# Patient Record
Sex: FEMALE | Race: WHITE | NOT HISPANIC OR LATINO | Employment: FULL TIME | URBAN - METROPOLITAN AREA
[De-identification: names, ages, dates, MRNs, and addresses within clinical notes are randomized per-mention and may not be internally consistent; named-entity substitution may affect disease eponyms.]

---

## 2020-06-03 ENCOUNTER — CONVERSION ENCOUNTER (OUTPATIENT)
Dept: SURGERY | Facility: CLINIC | Age: 25
End: 2020-06-03

## 2020-06-03 ENCOUNTER — OFFICE VISIT CONVERTED (OUTPATIENT)
Dept: SURGERY | Facility: CLINIC | Age: 25
End: 2020-06-03
Attending: NURSE PRACTITIONER

## 2020-06-04 ENCOUNTER — HOSPITAL ENCOUNTER (OUTPATIENT)
Dept: ULTRASOUND IMAGING | Facility: HOSPITAL | Age: 25
Discharge: HOME | End: 2020-06-04
Attending: NURSE PRACTITIONER

## 2020-06-10 ENCOUNTER — OFFICE VISIT CONVERTED (OUTPATIENT)
Dept: SURGERY | Facility: CLINIC | Age: 25
End: 2020-06-10
Attending: SURGERY

## 2020-06-17 LAB — SARS-COV-2 RNA SPEC QL NAA+PROBE: NOT DETECTED

## 2020-06-19 ENCOUNTER — HOSPITAL ENCOUNTER (OUTPATIENT)
Dept: PERIOP | Facility: HOSPITAL | Age: 25
Setting detail: HOSPITAL OUTPATIENT SURGERY
Discharge: HOME | End: 2020-06-19
Attending: SURGERY

## 2020-06-19 LAB — HCG UR QL: NEGATIVE

## 2020-07-02 ENCOUNTER — OFFICE VISIT CONVERTED (OUTPATIENT)
Dept: SURGERY | Facility: CLINIC | Age: 25
End: 2020-07-02
Attending: SURGERY

## 2021-05-10 NOTE — H&P
History and Physical      Patient Name: Annette Stark   Patient ID: [id number]   Sex: Female   YOB: 1995    Primary Care Provider: Keyla BRIDGES   Referring Provider: Keyla BRIDGES    Visit Date: Mandy 3, 2020    Provider: YULIANA Wooten   Location: [facility]   Location Address: [address] [place] [address]  [id number]   Location Phone: [phone]          Chief Complaint  · Nausea  · Vomiting  · Epigastric Pain  · GERD      History Of Present Illness  The patient is a 24 year old /White female presenting to the [facility] office on a referral from Keyla BRIDGES.   Annette Stark needs to have a diagnostic EGD.     Patient currently complains of: N/V and epigastric pain        Patient presents today on referral from Jacqueline Albarran for epigastric pain and nausea and vomiting.  Patient reports that she has been on Carafate and pantoprazole without any relief in her symptoms.  She reports having epigastric pain associated with nausea and vomiting about 30 minutes after eating.  She really notices symptoms after eating fatty or spicy foods.  She denies any diarrhea.       Past Medical History  Disease Name Date Onset Notes   Hyperthyroidism --  --    Hypothyroidism --  --          Past Surgical History  Procedure Name Date Notes   Ear Tubes --  --          Medication List  Name Date Started Instructions   buspirone 15 mg oral tablet  take 1 tablet by oral route 4 times a day   cetirizine 10 mg oral tablet  take 1 tablet (10 mg) by oral route once daily   levothyroxine 88 mcg oral tablet  take 1 tablet (88 mcg) by oral route once daily   oxcarbazepine 300 mg oral tablet  take 1 tablet (300 mg) by oral route 2 times per day   pantoprazole 40 mg oral tablet,delayed release (DR/EC)  take 1 tablet (40 mg) by oral route once daily   Tri-Sprintec (28) 0.18/0.215/0.25 mg-35 mcg (28) oral tablet  take 1 tablet by oral route once daily  "        Allergy List  Allergen Name Date Reaction Notes   Bactrim --  --  --    Cipro --  --  --    PENICILLINS --  --  --          Family Medical History  Disease Name Relative/Age Notes   Family history of colon cancer Grandfather (paternal)/70s   Grandfather (paternal)/70s   -Father's Family History Unknown Father/   Father   Diabetes  --          Social History  Finding Status Start/Stop Quantity Notes   Alcohol Never --/-- --  --    Caffeine Former --/-- --  --    Tobacco Never --/-- --  --          Review of Systems  · Constitutional  o Denies  o : fever, chills  · Eyes  o Denies  o : yellowish discoloration of eyes  · HENT  o Denies  o : difficulty swallowing  · Cardiovascular  o Denies  o : chest pain, chest pain on exertion  · Respiratory  o Denies  o : shortness of breath  · Gastrointestinal  o Admits  o : nausea, vomiting, reflux, abdominal pain  o Denies  o : diarrhea, constipation, heartburn  · Genitourinary  o Denies  o : abnormal color of urine  · Integument  o Denies  o : rash  · Neurologic  o Denies  o : tingling or numbness  · Musculoskeletal  o Denies  o : joint pain  · Endocrine  o Denies  o : weight gain, weight loss      Vitals  Date Time BP Position Site L\R Cuff Size HR RR TEMP (F) WT  HT  BMI kg/m2 BSA m2 O2 Sat        06/03/2020 10:08 AM       16  204lbs 16oz 5'  3\" 36.31 2.03           Physical Examination  · Constitutional  o Appearance  o : well developed, well-nourished, obese, patient in no apparent distress  · Head and Face  o Head  o :   § Inspection  § : atraumatic, normocephalic  o Face  o :   § Inspection  § : no facial lesions  · Eyes  o Conjunctivae  o : conjunctivae normal  o Sclerae  o : sclerae white  · Neck  o Inspection/Palpation  o : normal appearance, no masses or tenderness, trachea midline  · Respiratory  o Respiratory Effort  o : breathing unlabored  · Skin and Subcutaneous Tissue  o General Inspection  o : no lesions present, no areas of discoloration, skin turgor " normal, texture normal  · Neurologic  o Mental Status Examination  o :   § Orientation  § : grossly oriented to person, place and time  § Attention  § : attention normal, concentration abilities normal  § Fund of Knowledge  § : fund of knowledge within normal limits, patient aware of current events  o Gait and Station  o : normal gait, able to stand without difficulty  · Psychiatric  o Judgement and Insight  o : judgment and insight intact  o Mood and Affect  o : mood normal, affect appropriate     Abdomen: soft, round, non-distended, tenderness in the RUQ and epigastric area, no rebound tenderness noted.           Assessment  · Abdominal Pain, Epigastric     789.06/R10.13  · Nausea & vomiting     787.01/R11.2      Plan  · Orders  o Consent for Esophagogastroduodenoscopy (EGD) with dilation - Possible risks/complications, benefits, and alternatives to surgical or invasive procedure have been explained to patient and/or legal guardian. - Patient has been evaluated and can tolerate anesthesia and/or sedation. Risks, benefits, and alternatives to anesthesia and sedation have been explained to patient and/or legal guardian. (02062) - 789.06/R10.13, 787.01/R11.2 - 06/03/2020  o Gallbladder U/S (91253) - 789.06/R10.13, 787.01/R11.2 - 06/04/2020   If negative proceed with disida scan  o DISIDA (HIDA) Scan (76740) - 789.06/R10.13, 787.01/R11.2 - 06/04/2020  · Medications  o Medications have been Reconciled  o Transition of Care or Provider Policy  · Instructions  o Surgical Facility: [facility]  o Handouts Provided Pre-Procedure Instructions including date, time, and location of procedure.   o PLAN: Proceeed with EGD. Patient understands risks/benefits and is willing to proceed.   o ***Surgical Orders***  o RISK AND BENEFITS:  o Given these options, the patient has verbally expressed an understanding of the risks of the surgery and finds these risks acceptable. Will proceed with surgery as soon as  possible.  o O.R. PREP: Per protocol   o IV: Per Anesthesia  o Please sign permit for: Esophagogastroduodenoscopy with possible biopsies and dilation by Dr. Shore.  o The above History and Physical Examination has been completed within 30 days of admission.  o ***Patient Status***  o Outpatient  o Follow up in the in the office post procedure.  o I will get a gallbladder ultrasound if negative will proceed with DISIDA scan. If both imaging are negative we will proceed with EGD. Patient verbalizes understanding of the plan and is agreeable.  o Electronically Identified Patient Education Materials Provided Electronically  · Disposition  o Call or Return if symptoms worsen or persist.            Electronically Signed by: YULIANA Wooten -Author on Mandy 3, 2020 10:18:34 AM

## 2021-05-10 NOTE — H&P
History and Physical      Patient Name: Annette Stark   Patient ID: [id number]   Sex: Female   YOB: 1995    Primary Care Provider: Keyla BRIDGES   Referring Provider: Keyla BRIDGES    Visit Date: Mandy 10, 2020    Provider: Cassius Robison MD   Location: [facility]   Location Address: [address] [place] [address]  [id number]   Location Phone: [phone]          Chief Complaint  · Outpatient History & Physical / Surgical Orders  · Gallbladder Consult      History Of Present Illness  Annette Stark is a 24 year old /White female who presents to the office today as a consult from Keyla BRIDGES. She presents today for evaluation for cholecystectomy. The patient reports that for several weeks, she has had right upper quadrant pain. She reports that the pain has gotten worse in the last week or two. She reports the pain starts in her epigastrium or sometimes the right side and radiates around to her right side. The pain is associated with nausea and vomiting. The pain is intermittent. It is sharp and stabbing and, again, feels like it is going right through to her back. She denies any scleral icterus or jaundice. She reports that eating makes the pain worse. She denies any tea colored urine.       Past Medical History  Hyperthyroidism; Hypothyroidism         Past Surgical History  Ear Tubes         Medication List  buspirone 15 mg oral tablet; cetirizine 10 mg oral tablet; levothyroxine 88 mcg oral tablet; oxcarbazepine 300 mg oral tablet; pantoprazole 40 mg oral tablet,delayed release (DR/EC); Tri-Sprintec (28) 0.18/0.215/0.25 mg-35 mcg (28) oral tablet         Allergy List  Bactrim; Cipro; PENICILLINS         Family Medical History  Family history of colon cancer; -Father's Family History Unknown; Diabetes         Social History  Alcohol (Never); Caffeine (Former); Tobacco (Never)         Review of Systems  · Gastrointestinal  o Denies  o :  "nausea, vomiting, diarrhea, constipation      Vitals  Date Time BP Position Site L\R Cuff Size HR RR TEMP (F) WT  HT  BMI kg/m2 BSA m2 O2 Sat HC       06/10/2020 10:57 AM       14  207lbs 0oz 5'  3\" 36.67 2.04           Physical Examination  · Constitutional  o Appearance  o : well developed, well-nourished, alert and in no acute distress  · Head and Face  o Head  o :   § Inspection  § : no deformities or lesions  · Eyes  o Conjunctivae  o : clear  o Sclerae  o : clear  · Neck  o Inspection/Palpation  o : normal appearance, no masses or tenderness, trachea midline  · Respiratory  o Respiratory Effort  o : breathing unlabored  o Inspection of Chest  o : normal appearance, no retractions  · Cardiovascular  o Heart  o : regular rate and rhythm  · Gastrointestinal  o Abdominal Examination  o : abdomen is soft  · Lymphatic  o Neck  o : no lymphadenopathy present  o Axilla  o : no lymphadenopathy present  o Groin  o : no lymphadenopathy present  · Skin and Subcutaneous Tissue  o General Inspection  o : no rashes present, no lesions present, no areas of discoloration  · Neurologic  o Cranial Nerves  o : grossly intact  o Sensation  o : grossly intact  o Gait and Station  o :   § Gait Screening  § : normal gait, able to stand without diffculty  o Cerebellar Function  o : no obvious abnormalities  · Psychiatric  o Judgement and Insight  o : judgment and insight intact  o Mood and Affect  o : mood normal, affect appropriate          Assessment  · Pre-Surgical Orders     V72.84  · Gall Stones     575.10  · Pre-op testing     V72.84/Z01.818       Patient with gallstones and possible biliary dyskinesia based on her HIDA scan.     Problems Reconciled  Plan  · Orders  o General Surgery Order (GENOR) - 575.10 - 06/19/2020  o [facility] Pre-Op Covid-19 Screening (86091) - V72.84/Z01.818 - 06/16/2020   [facility]- 06/16 @ 9:30a  · Medications  o Medications have been Reconciled  o Transition of Care or Provider " Policy  · Instructions  o PLAN:   o Handouts Provided-Pre-Procedure Instructions including date and time and location of procedure.  o Surgical Facility: [facility]  o ****Patient Status****  o Outpatient  o ********************  o RISK AND BENEFITS:  o Consent for surgery: Given these options, the patient has verbally expressed an understanding of the risks of surgery and finds these risks acceptable. We will proceed with surgery as soon as possible.  o Consult Anesthesia for any post-operative block, or any pain management procedure deemed necessary by the anestesiologist for adequate post-operative pain control.   o O.R. PREP: Per protocol  o IV: Per Anesthesia  o PLEASE SIGN PERMIT FOR:  o *__Kefzol 2 gram IV on call to OR.  o Indocyanine Green- 2.5MG IV- On Call To OR  o *___The above History and Physical Examination has been completed within 30 days of admission.  o Pre-Admission Testing Date: Phone Screen 06/15 @ 2:15p  o Electronically Identified Patient Education Materials Provided Electronically     We will plan for laparoscopic cholecystectomy in the near future. Risks, benefits, and alternatives were discussed with the patient extensively. All questions were answered. The patient voiced understanding and agrees to proceed with the above plan.             Electronically Signed by: Roxana Macias-, -Author on June 11, 2020 09:41:25 AM  Electronically Co-signed by: Cassius Robison MD -Reviewer on June 11, 2020 01:05:10 PM

## 2021-05-13 NOTE — PROGRESS NOTES
Progress Note      Patient Name: Annette Stark   Patient ID: [id number]   Sex: Female   YOB: 1995    Primary Care Provider: Keyla BRIDGES   Referring Provider: Keyla BRIDGES    Visit Date: July 2, 2020    Provider: Cassius Robison MD   Location[facility]   Location Address: [address] [place] [address]  [id number]   Location Phone: [phone]          Chief Complaint  · Status Post Surgery      History Of Present Illness  Annette Stark is a 24 year old /White female who presents today for a postoperative visit.      The patient is a 24-year-old female who follows up status post laparoscopic cholecystectomy.  The patient reports she has done well after her procedure, not reporting any unexpected signs or symptoms.  She is eating and drinking normally and having regular bowel movements.  The symptoms that she was having prior to the operation are much improved, if not gone.  Her only complaint is some soreness at the subxiphoid incision.       Past Medical History  Hyperthyroidism; Hypothyroidism         Past Surgical History  Ear Tubes         Medication List  buspirone 15 mg oral tablet; cetirizine 10 mg oral tablet; levothyroxine 88 mcg oral tablet; oxcarbazepine 300 mg oral tablet; pantoprazole 40 mg oral tablet,delayed release (DR/EC); Tri-Sprintec (28) 0.18/0.215/0.25 mg-35 mcg (28) oral tablet         Allergy List  Bactrim; Cipro; PENICILLINS         Family Medical History  Family history of colon cancer; -Father's Family History Unknown; Diabetes         Social History  Alcohol (Never); Caffeine (Former); Tobacco (Never)         Review of Systems  · Cardiovascular  o Denies  o : chest pain on exertion, shortness of breath, lower extremity swelling  · Respiratory  o Denies  o : shortness of breath, coughing up blood  · Gastrointestinal  o Denies  o : chronic abdominal pain      Vitals  Date Time BP Position Site L\R Cuff Size HR RR TEMP (F)  "WT  HT  BMI kg/m2 BSA m2 O2 Sat        07/02/2020 09:09 AM       12  203lbs 8oz 5'  3\" 36.05 2.03           Physical Examination  · Constitutional  o Appearance  o : well developed, well-nourished, alert and in no acute distress  · Head and Face  o Head  o :   § Inspection  § : no deformities or lesions  · Eyes  o Conjunctivae  o : clear  o Sclerae  o : nonicteric  · Neck  o Inspection/Palpation  o : normal appearance, no masses or tenderness, trachea midline  · Respiratory  o Respiratory Effort  o : breathing unlabored  o Inspection of Chest  o : normal appearance, no retractions  · Cardiovascular  o Heart  o : regular rate and rhythm  · Gastrointestinal  o Abdominal Examination  o :   § Abdomen  § : Soft. Incisions appear to be healing well and no signs of infection.   · Lymphatic  o Neck  o : no lymphadenopathy present  o Axilla  o : no lymphadenopathy present  o Groin  o : no lymphadenopathy present  · Skin and Subcutaneous Tissue  o General Inspection  o : no rashes present, no lesions present, no areas of discoloration  · Neurologic  o Cranial Nerves  o : grossly intact  o Sensation  o : grossly intact  o Gait and Station  o :   § Gait Screening  § : normal gait, able to stand without diffculty  o Cerebellar Function  o : no obvious abnormalities  · Psychiatric  o Judgement and Insight  o : judgment and insight intact  o Mood and Affect  o : mood normal, affect appropriate     PATHOLOGY FINDINGS:  Chronic inflammation, cholesterolosis, and cholelithiasis.               Assessment  · Encounter for examination following surgery     V67.00/Z09       The patient is status post laparoscopic cholecystectomy with the pathology findings of chronic inflammation, cholesterolosis, and cholelithiasis.     Problems Reconciled  Plan  · Medications  o Medications have been Reconciled  o Transition of Care or Provider Policy  · Instructions  o PLAN:  o Follow up as needed.     The patient is doing well and healing as " expected.  I am pleased with her overall progress.  At this point in time she can follow up with me as needed, call with any questions or concerns.  Discussed with the patient and all questions were answered.  She voiced understanding and agreed to proceed with the above plan.             Electronically Signed by: Bianca Mix-, Other -Author on July 6, 2020 10:56:55 AM  Electronically Co-signed by: Cassius Robison MD -Reviewer on July 6, 2020 03:44:23 PM

## 2021-05-15 VITALS — RESPIRATION RATE: 14 BRPM | HEIGHT: 63 IN | BODY MASS INDEX: 36.68 KG/M2 | WEIGHT: 207 LBS

## 2021-05-15 VITALS — WEIGHT: 203.5 LBS | RESPIRATION RATE: 12 BRPM | BODY MASS INDEX: 36.06 KG/M2 | HEIGHT: 63 IN

## 2021-05-15 VITALS — HEIGHT: 63 IN | BODY MASS INDEX: 36.32 KG/M2 | RESPIRATION RATE: 16 BRPM | WEIGHT: 205 LBS

## 2021-09-14 ENCOUNTER — TELEPHONE (OUTPATIENT)
Dept: OBSTETRICS AND GYNECOLOGY | Facility: CLINIC | Age: 26
End: 2021-09-14

## 2021-09-14 RX ORDER — NORGESTIMATE AND ETHINYL ESTRADIOL 7DAYSX3 28
1 KIT ORAL DAILY
Qty: 28 TABLET | Refills: 1 | Status: SHIPPED | OUTPATIENT
Start: 2021-09-14 | End: 2022-09-14

## 2021-09-14 RX ORDER — NORGESTIMATE AND ETHINYL ESTRADIOL 7DAYSX3 28
1 KIT ORAL DAILY
Qty: 28 TABLET | Refills: 1 | Status: SHIPPED | OUTPATIENT
Start: 2021-09-14 | End: 2021-09-14

## 2022-04-20 ENCOUNTER — CONSULT (OUTPATIENT)
Dept: ONCOLOGY | Facility: HOSPITAL | Age: 27
End: 2022-04-20

## 2022-04-20 VITALS
TEMPERATURE: 97.8 F | WEIGHT: 216.05 LBS | OXYGEN SATURATION: 99 % | BODY MASS INDEX: 38.27 KG/M2 | SYSTOLIC BLOOD PRESSURE: 118 MMHG | DIASTOLIC BLOOD PRESSURE: 68 MMHG | HEART RATE: 70 BPM | RESPIRATION RATE: 16 BRPM

## 2022-04-20 DIAGNOSIS — Z83.2 FAMILY HISTORY OF FACTOR V LEIDEN MUTATION: Primary | ICD-10-CM

## 2022-04-20 DIAGNOSIS — D64.9 ANEMIA, UNSPECIFIED TYPE: ICD-10-CM

## 2022-04-20 PROBLEM — IMO0002: Status: ACTIVE | Noted: 2018-06-20

## 2022-04-20 PROBLEM — E03.9 HYPOTHYROIDISM: Status: ACTIVE | Noted: 2017-05-08

## 2022-04-20 PROBLEM — E55.9 VITAMIN D DEFICIENCY: Status: ACTIVE | Noted: 2022-02-02

## 2022-04-20 PROBLEM — F32.A DEPRESSIVE DISORDER: Status: ACTIVE | Noted: 2017-10-17

## 2022-04-20 PROCEDURE — G0463 HOSPITAL OUTPT CLINIC VISIT: HCPCS | Performed by: NURSE PRACTITIONER

## 2022-04-20 PROCEDURE — 99204 OFFICE O/P NEW MOD 45 MIN: CPT | Performed by: NURSE PRACTITIONER

## 2022-04-20 RX ORDER — BUPROPION HYDROCHLORIDE 300 MG/1
TABLET ORAL
COMMUNITY

## 2022-04-20 RX ORDER — OXCARBAZEPINE 300 MG/1
TABLET, FILM COATED ORAL
COMMUNITY

## 2022-04-20 RX ORDER — DOXYCYCLINE HYCLATE 50 MG/1
324 CAPSULE, GELATIN COATED ORAL
Qty: 90 TABLET | Refills: 1 | Status: SHIPPED | OUTPATIENT
Start: 2022-04-20

## 2022-04-20 RX ORDER — BUSPIRONE HYDROCHLORIDE 15 MG/1
TABLET ORAL
COMMUNITY

## 2022-04-20 RX ORDER — LEVOTHYROXINE SODIUM 88 UG/1
88 TABLET ORAL DAILY
COMMUNITY

## 2022-04-20 NOTE — PROGRESS NOTES
Chief Complaint  Anemia    Ro Garcia APRN  Provider, No Known    Records Obtained:  Records of the patients history including those obtained from  [facility] were reviewed and summarized in detail.    Reason for referral: anemia    Subjective          Annette Stark presents to [facility] for anemia.     History of Present Illness   Ms. Annette Stark presents with her mother for new patient evaluation for anemia from her PCP, YULIANA Cooney.     History of thyroid disease and is on thyroid medication. She also reports a history of FVL mutation on her father side of the family in all the female.     Reports she has quite a bit of fatigue. Denies any heavy menstrual cycles. Reports they last about 3 days and then done. Denies any change in bowel habits.     Lab work done on 3/28/22 shows normal thyroid, CBC was normal except for elevated RBC count of 5.25. Reticulocyte count absolute elevated. Iron studies with low iron sat of 14%, low normal iron of 42. Ferritin level is not done. Folate and B-12 are normal. Vitamin D level is normal.         Oncology/Hematology History    No history exists.       Review of Systems   Constitutional: Positive for fatigue. Negative for appetite change, diaphoresis, fever, unexpected weight gain and unexpected weight loss.   HENT: Negative for hearing loss, sore throat and voice change.    Eyes: Negative for blurred vision, double vision, pain, redness and visual disturbance.   Respiratory: Negative for cough, shortness of breath and wheezing.    Cardiovascular: Negative for chest pain, palpitations and leg swelling.   Endocrine: Negative for cold intolerance, heat intolerance, polydipsia and polyuria.   Genitourinary: Negative for decreased urine volume, difficulty urinating, frequency and urinary incontinence.   Musculoskeletal: Negative for arthralgias, back pain, joint swelling and myalgias.   Skin: Negative for color  change, rash, skin lesions and wound.   Neurological: Positive for dizziness, light-headedness and headache. Negative for seizures and numbness.   Hematological: Negative for adenopathy. Does not bruise/bleed easily.   Psychiatric/Behavioral: Negative for depressed mood. The patient is not nervous/anxious.    All other systems reviewed and are negative.      Current Outpatient Medications on File Prior to Visit   Medication Sig Dispense Refill   • buPROPion XL (WELLBUTRIN XL) 300 MG 24 hr tablet Wellbutrin  mg 24 hr tablet, extended release   Take 1 tablet every day by oral route.     • busPIRone (BUSPAR) 15 MG tablet buspirone 15 mg oral tablet take 1 tablet by oral route 4 times a day   Active     • levothyroxine (SYNTHROID, LEVOTHROID) 88 MCG tablet Take 88 mcg by mouth Daily.     • norgestimate-ethinyl estradiol (TriNessa, 28,) 0.18/0.215/0.25 MG-35 MCG per tablet Take 1 tablet by mouth Daily. 28 tablet 1   • OXcarbazepine (TRILEPTAL) 300 MG tablet oxcarbazepine 300 mg oral tablet take 1 tablet (300 mg) by oral route 2 times per day   Active       No current facility-administered medications on file prior to visit.       Allergies   Allergen Reactions   • Ciprofloxacin Unknown - Low Severity   • Penicillins Unknown - Low Severity   • Sulfamethoxazole-Trimethoprim Unknown - High Severity     Past Medical History:   Diagnosis Date   • Anemia    • Thyroid disorder      Past Surgical History:   Procedure Laterality Date   • CHOLECYSTECTOMY       Social History     Socioeconomic History   • Marital status: Single   Tobacco Use   • Smoking status: Never Smoker   • Smokeless tobacco: Never Used   Substance and Sexual Activity   • Alcohol use: Not Currently   • Drug use: Not Currently   • Sexual activity: Defer     Family History   Problem Relation Age of Onset   • Factor V Leiden deficiency Paternal Aunt    • Factor V Leiden deficiency Paternal Grandmother    • Breast cancer Paternal Grandmother    • Colon cancer  Paternal Grandfather        There is no immunization history on file for this patient.    Objective   Physical Exam  Constitutional:       Appearance: Normal appearance. She is obese.   HENT:      Head: Normocephalic.      Nose: Nose normal.      Mouth/Throat:      Mouth: Mucous membranes are moist.   Eyes:      Pupils: Pupils are equal, round, and reactive to light.   Cardiovascular:      Rate and Rhythm: Normal rate and regular rhythm.      Pulses: Normal pulses.      Heart sounds: Normal heart sounds. No murmur heard.  Pulmonary:      Effort: Pulmonary effort is normal. No respiratory distress.      Breath sounds: No wheezing, rhonchi or rales.   Abdominal:      Palpations: Abdomen is soft.   Musculoskeletal:         General: Normal range of motion.      Cervical back: Normal range of motion and neck supple.   Skin:     General: Skin is warm and dry.      Capillary Refill: Capillary refill takes less than 2 seconds.   Neurological:      General: No focal deficit present.      Mental Status: She is alert and oriented to person, place, and time.   Psychiatric:         Mood and Affect: Mood normal.         Behavior: Behavior normal.         Thought Content: Thought content normal.         Judgment: Judgment normal.         Vitals:    04/20/22 1440   BP: 118/68   Pulse: 70   Resp: 16   Temp: 97.8 °F (36.6 °C)   SpO2: 99%   Weight: 98 kg (216 lb 0.8 oz)   PainSc:   5   PainLoc: Head     ECOG score: 0         ECOG: (0) Fully Active - Able to Carry On All Pre-disease Performance Without Restriction  Fall Risk Assessment was completed, and patient is at low risk for falls.  PHQ-9 Total Score: 0       The patient is  experiencing fatigue. Fatigue score: 8    PT/OT Functional Screening: PT fx screen: No needs identified  Speech Functional Screening: Speech fx screen: No needs identified  Rehab to be ordered: Rehab to be ordered: No needs identified        Result Review :   The following data was reviewed by: Funmilayo RUFFIN  YULIANA Thompson on 04/20/2022:  No results found for: HGB, HCT, MCV, PLT, WBC, NEUTROABS, LYMPHSABS, MONOSABS, EOSABS, BASOSABS  No results found for: GLUCOSE, BUN, CREATININE, NA, K, CL, CO2, CALCIUM, PROTEINTOT, ALBUMIN, BILITOT, ALKPHOS, AST, ALT       Assessment and Plan    Diagnoses and all orders for this visit:    1. Family history of factor V Leiden mutation (Primary)  -     Factor 5 Leiden; Future    2. Anemia, unspecified type  -     CBC & Differential; Future  -     Comprehensive Metabolic Panel; Future  -     Folate; Future  -     Vitamin B12; Future  -     Iron Profile; Future  -     Ferritin; Future  -     ferrous gluconate (FERGON) 324 MG tablet; Take 1 tablet by mouth Daily With Breakfast.  Dispense: 90 tablet; Refill: 1    Reports family history of factor V leiden, although this should not be causing her fatigue. We will put her on oral ferrous gluconate, 1 tab PO daily. This will be sent to her pharmacy. Recheck lab work in 6-8 weeks to see if she has any improvement in her fatigue and iron studies. We will check her for any factor V Leiden mutation just so she will know if she has it or not.     I instructed her to call if she can not tolerate the oral iron.         Patient Follow Up: 8 weeks with NP.     Patient was given instructions and counseling regarding her condition or for health maintenance advice. Please see specific information pulled into the AVS if appropriate.     Funmilayo Thompson, YULIANA    4/20/2022

## 2022-05-27 ENCOUNTER — TELEPHONE (OUTPATIENT)
Dept: ONCOLOGY | Facility: HOSPITAL | Age: 27
End: 2022-05-27

## 2022-05-27 NOTE — TELEPHONE ENCOUNTER
called to inform that provider will be out of the office on 7/1, changed appt to 6/30.  asked her to call us if that change doesn't work.

## 2022-06-29 ENCOUNTER — LAB (OUTPATIENT)
Dept: ONCOLOGY | Facility: HOSPITAL | Age: 27
End: 2022-06-29

## 2022-06-29 DIAGNOSIS — Z83.2 FAMILY HISTORY OF FACTOR V LEIDEN MUTATION: ICD-10-CM

## 2022-06-29 DIAGNOSIS — D64.9 ANEMIA, UNSPECIFIED TYPE: ICD-10-CM

## 2022-06-29 LAB
ALBUMIN SERPL-MCNC: 4.51 G/DL (ref 3.5–5.2)
ALBUMIN/GLOB SERPL: 2.3 G/DL
ALP SERPL-CCNC: 69 U/L (ref 39–117)
ALT SERPL W P-5'-P-CCNC: 10 U/L (ref 1–33)
ANION GAP SERPL CALCULATED.3IONS-SCNC: 9.5 MMOL/L (ref 5–15)
AST SERPL-CCNC: 13 U/L (ref 1–32)
BASOPHILS # BLD AUTO: 0.05 10*3/MM3 (ref 0–0.2)
BASOPHILS NFR BLD AUTO: 0.7 % (ref 0–1.5)
BILIRUB SERPL-MCNC: 0.5 MG/DL (ref 0–1.2)
BUN SERPL-MCNC: 12 MG/DL (ref 6–20)
BUN/CREAT SERPL: 15.4 (ref 7–25)
CALCIUM SPEC-SCNC: 9.3 MG/DL (ref 8.6–10.5)
CHLORIDE SERPL-SCNC: 104 MMOL/L (ref 98–107)
CO2 SERPL-SCNC: 23.5 MMOL/L (ref 22–29)
CREAT SERPL-MCNC: 0.78 MG/DL (ref 0.57–1)
DEPRECATED RDW RBC AUTO: 36.8 FL (ref 37–54)
EGFRCR SERPLBLD CKD-EPI 2021: 107.6 ML/MIN/1.73
EOSINOPHIL # BLD AUTO: 0.28 10*3/MM3 (ref 0–0.4)
EOSINOPHIL NFR BLD AUTO: 4 % (ref 0.3–6.2)
ERYTHROCYTE [DISTWIDTH] IN BLOOD BY AUTOMATED COUNT: 12.5 % (ref 12.3–15.4)
FERRITIN SERPL-MCNC: 87.15 NG/ML (ref 13–150)
FOLATE SERPL-MCNC: 17 NG/ML (ref 4.78–24.2)
GLOBULIN UR ELPH-MCNC: 2 GM/DL
GLUCOSE SERPL-MCNC: 88 MG/DL (ref 65–99)
HCT VFR BLD AUTO: 41.1 % (ref 34–46.6)
HGB BLD-MCNC: 13.6 G/DL (ref 12–15.9)
IMM GRANULOCYTES # BLD AUTO: 0.02 10*3/MM3 (ref 0–0.05)
IMM GRANULOCYTES NFR BLD AUTO: 0.3 % (ref 0–0.5)
IRON 24H UR-MRATE: 121 MCG/DL (ref 37–145)
IRON SATN MFR SERPL: 36 % (ref 20–50)
LYMPHOCYTES # BLD AUTO: 2.98 10*3/MM3 (ref 0.7–3.1)
LYMPHOCYTES NFR BLD AUTO: 42.6 % (ref 19.6–45.3)
MCH RBC QN AUTO: 26.6 PG (ref 26.6–33)
MCHC RBC AUTO-ENTMCNC: 33.1 G/DL (ref 31.5–35.7)
MCV RBC AUTO: 80.4 FL (ref 79–97)
MONOCYTES # BLD AUTO: 0.55 10*3/MM3 (ref 0.1–0.9)
MONOCYTES NFR BLD AUTO: 7.9 % (ref 5–12)
NEUTROPHILS NFR BLD AUTO: 3.11 10*3/MM3 (ref 1.7–7)
NEUTROPHILS NFR BLD AUTO: 44.5 % (ref 42.7–76)
PLATELET # BLD AUTO: 284 10*3/MM3 (ref 140–450)
PMV BLD AUTO: 10 FL (ref 6–12)
POTASSIUM SERPL-SCNC: 3.8 MMOL/L (ref 3.5–5.2)
PROT SERPL-MCNC: 6.5 G/DL (ref 6–8.5)
RBC # BLD AUTO: 5.11 10*6/MM3 (ref 3.77–5.28)
SODIUM SERPL-SCNC: 137 MMOL/L (ref 136–145)
TIBC SERPL-MCNC: 338 MCG/DL (ref 298–536)
TRANSFERRIN SERPL-MCNC: 227 MG/DL (ref 200–360)
VIT B12 BLD-MCNC: 746 PG/ML (ref 211–946)
WBC NRBC COR # BLD: 6.99 10*3/MM3 (ref 3.4–10.8)

## 2022-06-29 PROCEDURE — 85025 COMPLETE CBC W/AUTO DIFF WBC: CPT

## 2022-06-29 PROCEDURE — 82728 ASSAY OF FERRITIN: CPT

## 2022-06-29 PROCEDURE — 83540 ASSAY OF IRON: CPT

## 2022-06-29 PROCEDURE — 84466 ASSAY OF TRANSFERRIN: CPT

## 2022-06-29 PROCEDURE — 82607 VITAMIN B-12: CPT

## 2022-06-29 PROCEDURE — 82746 ASSAY OF FOLIC ACID SERUM: CPT

## 2022-06-29 PROCEDURE — 80053 COMPREHEN METABOLIC PANEL: CPT

## 2022-06-29 PROCEDURE — 81241 F5 GENE: CPT

## 2022-06-29 PROCEDURE — 36415 COLL VENOUS BLD VENIPUNCTURE: CPT

## 2022-06-30 ENCOUNTER — APPOINTMENT (OUTPATIENT)
Dept: ONCOLOGY | Facility: HOSPITAL | Age: 27
End: 2022-06-30

## 2022-07-01 LAB — F5 GENE MUT ANL BLD/T: NORMAL

## 2022-07-05 ENCOUNTER — TELEPHONE (OUTPATIENT)
Dept: ONCOLOGY | Facility: HOSPITAL | Age: 27
End: 2022-07-05

## 2022-07-06 ENCOUNTER — APPOINTMENT (OUTPATIENT)
Dept: ONCOLOGY | Facility: HOSPITAL | Age: 27
End: 2022-07-06

## 2022-07-08 ENCOUNTER — OFFICE VISIT (OUTPATIENT)
Dept: ONCOLOGY | Facility: HOSPITAL | Age: 27
End: 2022-07-08

## 2022-07-08 VITALS
WEIGHT: 220.24 LBS | DIASTOLIC BLOOD PRESSURE: 72 MMHG | OXYGEN SATURATION: 98 % | TEMPERATURE: 97.5 F | RESPIRATION RATE: 18 BRPM | SYSTOLIC BLOOD PRESSURE: 120 MMHG | BODY MASS INDEX: 39.01 KG/M2 | HEART RATE: 74 BPM

## 2022-07-08 DIAGNOSIS — E61.1 IRON DEFICIENCY: Primary | ICD-10-CM

## 2022-07-08 PROCEDURE — 99213 OFFICE O/P EST LOW 20 MIN: CPT | Performed by: INTERNAL MEDICINE

## 2022-07-08 PROCEDURE — G0463 HOSPITAL OUTPT CLINIC VISIT: HCPCS

## 2022-07-08 NOTE — PROGRESS NOTES
Annette BLAIR [facility]   : 1995     LOCATION: [facility] HEMATOLOGY & ONCOLOGY     Chief Complaint  Anemia    Referring Provider: MICAELA Henley  PCP: Provider, No Known    Oncology/Hematology History    No history exists.           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    CBC was normal except for elevated RBC count of 5.25. Reticulocyte count absolute elevated. Iron studies with low iron sat of 14%, low normal iron of 42. Ferritin level is not done. Folate and B-12 are normal. Vitamin D level is normal.   She was started on oral iron therapy  Review of Systems   Constitutional: Positive for fatigue.   All other systems reviewed and are negative.    Current Outpatient Medications on File Prior to Visit   Medication Sig Dispense Refill   • buPROPion XL (WELLBUTRIN XL) 300 MG 24 hr tablet Wellbutrin  mg 24 hr tablet, extended release   Take 1 tablet every day by oral route.     • busPIRone (BUSPAR) 15 MG tablet buspirone 15 mg oral tablet take 1 tablet by oral route 4 times a day   Active     • ferrous gluconate (FERGON) 324 MG tablet Take 1 tablet by mouth Daily With Breakfast. 90 tablet 1   • levothyroxine (SYNTHROID, LEVOTHROID) 88 MCG tablet Take 88 mcg by mouth Daily.     • norgestimate-ethinyl estradiol (TriNessa, 28,) 0.18/0.215/0.25 MG-35 MCG per tablet Take 1 tablet by mouth Daily. 28 tablet 1   • OXcarbazepine (TRILEPTAL) 300 MG tablet oxcarbazepine 300 mg oral tablet take 1 tablet (300 mg) by oral route 2 times per day   Active       No current facility-administered medications on file prior to visit.       Allergies   Allergen Reactions   • Ciprofloxacin Unknown - Low Severity   • Penicillins Unknown - Low Severity   • Sulfamethoxazole-Trimethoprim Unknown - High Severity       Past Medical History:   Diagnosis Date   • Anemia    • Thyroid disorder       Past Surgical History:   Procedure Laterality Date   • CHOLECYSTECTOMY       Social History     Socioeconomic History   • Marital status: Single   Tobacco Use   • Smoking status: Never Smoker   • Smokeless tobacco: Never Used   Substance and Sexual Activity   • Alcohol use: Not Currently   • Drug use: Not Currently   • Sexual activity: Defer     Family History   Problem Relation Age of Onset   • Factor V Leiden deficiency Paternal Aunt    • Factor V Leiden deficiency Paternal Grandmother    • Breast cancer Paternal Grandmother    • Colon cancer Paternal Grandfather        There is no immunization history on file for this patient.    Objective     /72   Pulse 74   Temp 97.5 °F (36.4 °C)   Resp 18   Wt 99.9 kg (220 lb 3.8 oz)   SpO2 98%   BMI 39.01 kg/m²         Physical Exam    Deferred for discussion      Iron   Date Value Ref Range Status   06/29/2022 121 37 - 145 mcg/dL Final     Iron Saturation   Date Value Ref Range Status   06/29/2022 36 20 - 50 % Final     Transferrin   Date Value Ref Range Status   06/29/2022 227 200 - 360 mg/dL Final     TIBC   Date Value Ref Range Status   06/29/2022 338 298 - 536 mcg/dL Final     Ferritin   Date Value Ref Range Status   06/29/2022 87.15 13.00 - 150.00 ng/mL Final     Vitamin B-12   Date Value Ref Range Status   06/29/2022 746 211 - 946 pg/mL Final     Folate   Date Value Ref Range Status   06/29/2022 17.00 4.78 - 24.20 ng/mL Final     ECOG score: 0           PHQ-9 Total Score:           Result Review :   The following data was reviewed by: Suad Yao MD on 07/08/2022:  Lab Results   Component Value Date    HGB 13.6 06/29/2022    HCT 41.1 06/29/2022    MCV 80.4 06/29/2022     06/29/2022    WBC 6.99 06/29/2022    NEUTROABS 3.11 06/29/2022    LYMPHSABS 2.98 06/29/2022    MONOSABS 0.55 06/29/2022    EOSABS 0.28 06/29/2022    BASOSABS 0.05 06/29/2022     Lab Results   Component Value Date    GLUCOSE 88 06/29/2022    BUN 12 06/29/2022    CREATININE  0.78 06/29/2022     06/29/2022    K 3.8 06/29/2022     06/29/2022    CO2 23.5 06/29/2022    CALCIUM 9.3 06/29/2022    PROTEINTOT 6.5 06/29/2022    ALBUMIN 4.51 06/29/2022    BILITOT 0.5 06/29/2022    ALKPHOS 69 06/29/2022    AST 13 06/29/2022    ALT 10 06/29/2022         Data reviewed: labs          Assessment and Plan      ASSESSMENT   anemia   fx hx of FVL   PLAN/RECOMMENDATIONS  Iron studies  are now wnl   hemoglobin is wnl   Factor V leiden mutation was not  detected  F/u prn  Diagnoses and all orders for this visit:    1. Iron deficiency (Primary)      I spent 20 minutes caring for Annette on this date of service. This time includes time spent by me in the following activities: reviewing tests, performing a medically appropriate examination and/or evaluation, counseling and educating the patient/family/caregiver, documenting information in the medical record and independently interpreting results and communicating that information with the patient/family/caregiver    Patient was given instructions and counseling regarding her condition or for health maintenance advice. Please see specific information pulled into the AVS if appropriate.     Suad Yao MD    7/8/2022

## 2022-09-22 PROCEDURE — 87086 URINE CULTURE/COLONY COUNT: CPT | Performed by: FAMILY MEDICINE

## 2022-09-23 ENCOUNTER — TRANSCRIBE ORDERS (OUTPATIENT)
Dept: ADMINISTRATIVE | Facility: HOSPITAL | Age: 27
End: 2022-09-23

## 2022-09-23 ENCOUNTER — HOSPITAL ENCOUNTER (OUTPATIENT)
Dept: GENERAL RADIOLOGY | Facility: HOSPITAL | Age: 27
Discharge: HOME | End: 2022-09-23
Admitting: NURSE PRACTITIONER

## 2022-09-23 ENCOUNTER — TELEPHONE (OUTPATIENT)
Dept: URGENT CARE | Facility: CLINIC | Age: 27
End: 2022-09-23

## 2022-09-23 DIAGNOSIS — R10.9 ABDOMINAL PAIN, UNSPECIFIED ABDOMINAL LOCATION: Primary | ICD-10-CM

## 2022-09-23 DIAGNOSIS — R10.9 ABDOMINAL PAIN, UNSPECIFIED ABDOMINAL LOCATION: ICD-10-CM

## 2022-09-23 PROCEDURE — 74018 RADEX ABDOMEN 1 VIEW: CPT

## 2023-05-23 ENCOUNTER — OFFICE VISIT (OUTPATIENT)
Dept: GASTROENTEROLOGY | Facility: CLINIC | Age: 28
End: 2023-05-23
Payer: COMMERCIAL

## 2023-05-23 VITALS
HEART RATE: 59 BPM | SYSTOLIC BLOOD PRESSURE: 133 MMHG | WEIGHT: 223 LBS | BODY MASS INDEX: 39.51 KG/M2 | DIASTOLIC BLOOD PRESSURE: 76 MMHG | HEIGHT: 63 IN

## 2023-05-23 DIAGNOSIS — R12 HEARTBURN: ICD-10-CM

## 2023-05-23 DIAGNOSIS — R13.19 ESOPHAGEAL DYSPHAGIA: Primary | ICD-10-CM

## 2023-05-23 DIAGNOSIS — R10.84 GENERALIZED ABDOMINAL PAIN: ICD-10-CM

## 2023-05-23 RX ORDER — PANTOPRAZOLE SODIUM 20 MG/1
20 TABLET, DELAYED RELEASE ORAL DAILY
Qty: 90 TABLET | Refills: 1 | Status: SHIPPED | OUTPATIENT
Start: 2023-05-23 | End: 2023-08-21

## 2023-05-23 NOTE — PROGRESS NOTES
Chief Complaint     Difficulty Swallowing and Abdominal Pain    History of Present Illness     Annette Stark is a 27 y.o. female who presents to [facility] GASTROENTEROLOGY on referral from YULIANA Cooney for a gastroenterology evaluation of dysphagia and abdominal pain.      She reports dysphagia that's intermittent.  This occurs every 2-3 days.  States that she feels foods get lodged and she has to induce vomiting to dislodge.  Denies this occurring with any particular foods, but it is worse with solids.     Admits frequent heartburn.  This occurs daily.  Will take OTC pepcid with little relief.      States that she will sometimes experience pain when lifting things in upper stomach and near umbilicus.  She describes this as a burning sensation in the abdomen.     History      Past Medical History:   Diagnosis Date   • Anemia    • Thyroid disorder        Past Surgical History:   Procedure Laterality Date   • CHOLECYSTECTOMY         Family History   Problem Relation Age of Onset   • Factor V Leiden deficiency Paternal Aunt    • Factor V Leiden deficiency Paternal Grandmother    • Breast cancer Paternal Grandmother    • Colon cancer Paternal Grandfather         Current Medications        Current Outpatient Medications:   •  busPIRone (BUSPAR) 15 MG tablet, buspirone 15 mg oral tablet take 1 tablet by oral route 4 times a day   Active, Disp: , Rfl:   •  levothyroxine (SYNTHROID, LEVOTHROID) 88 MCG tablet, Take 1 tablet by mouth Daily., Disp: , Rfl:   •  pantoprazole (Protonix) 20 MG EC tablet, Take 1 tablet by mouth Daily for 90 days., Disp: 90 tablet, Rfl: 1     Allergies     Allergies   Allergen Reactions   • Ciprofloxacin Unknown - Low Severity   • Penicillins Unknown - Low Severity   • Sulfamethoxazole-Trimethoprim Unknown - High Severity       Social History       Social History     Social History Narrative   • Not on file       Immunizations     Immunization:    There is no  "immunization history on file for this patient.       Objective     Objective     Vital Signs:   /76 (BP Location: Left arm, Patient Position: Sitting, Cuff Size: Adult)   Pulse 59   Ht 160 cm (63\")   Wt 101 kg (223 lb)   BMI 39.50 kg/m²       Physical Exam    Results      Result Review :   The following data was reviewed by: YULIANA Tafoya on 05/23/2023:    CBC w/diff        6/29/2022    08:55   CBC w/Diff   WBC 6.99     RBC 5.11     Hemoglobin 13.6     Hematocrit 41.1     MCV 80.4     MCH 26.6     MCHC 33.1     RDW 12.5     Platelets 284     Neutrophil Rel % 44.5     Immature Granulocyte Rel % 0.3     Lymphocyte Rel % 42.6     Monocyte Rel % 7.9     Eosinophil Rel % 4.0     Basophil Rel % 0.7       CMP        6/29/2022    08:55   CMP   Glucose 88     BUN 12     Creatinine 0.78     EGFR 107.6     Sodium 137     Potassium 3.8     Chloride 104     Calcium 9.3     Total Protein 6.5     Albumin 4.51     Globulin 2.0     Total Bilirubin 0.5     Alkaline Phosphatase 69     AST (SGOT) 13     ALT (SGPT) 10     Albumin/Globulin Ratio 2.3     BUN/Creatinine Ratio 15.4     Anion Gap 9.5       9/23/2022 Abdominal x-ray-normal.         Assessment and Plan        Assessment and Plan    Diagnoses and all orders for this visit:    1. Esophageal dysphagia (Primary)  -     Case Request; Standing  -     Case Request    2. Heartburn  -     Case Request; Standing  -     Case Request  -     pantoprazole (Protonix) 20 MG EC tablet; Take 1 tablet by mouth Daily for 90 days.  Dispense: 90 tablet; Refill: 1    3. Generalized abdominal pain  -     US Abdomen Complete; Future    Other orders  -     Follow Anesthesia Guidelines / Protocol; Future  -     Obtain Informed Consent; Future  -     Obtain Informed Consent; Standing  -     Verify NPO; Standing        ESOPHAGOGASTRODUODENOSCOPY (N/A) The risk of the endoscopy were discussed in detail. Possible risks/complications, benefits, and alternatives to surgical or invasive " procedure have been explained to patient and/or legal guardian; risks include bleeding, infection, and perforation. Patient has been evaluated and can tolerate anesthesia and/or sedation.       Follow Up        Follow Up   Return for F/U after procedure.  Patient was given instructions and counseling regarding her condition or for health maintenance advice. Please see specific information pulled into the AVS if appropriate.

## 2023-05-23 NOTE — PATIENT INSTRUCTIONS
Food Choices for Gastroesophageal Reflux Disease, Adult  When you have gastroesophageal reflux disease (GERD), the foods you eat and your eating habits are very important. Choosing the right foods can help ease your discomfort. Think about working with a food expert (dietitian) to help you make good choices.  What are tips for following this plan?  Reading food labels  Look for foods that are low in saturated fat. Foods that may help with your symptoms include:  Foods that have less than 5% of daily value (DV) of fat.  Foods that have 0 grams of trans fat.  Cooking  Do not ocampo your food.  Cook your food by baking, steaming, grilling, or broiling. These are all methods that do not need a lot of fat for cooking.  To add flavor, try to use herbs that are low in spice and acidity.  Meal planning    Choose healthy foods that are low in fat, such as:  Fruits and vegetables.  Whole grains.  Low-fat dairy products.  Lean meats, fish, and poultry.  Eat small meals often instead of eating 3 large meals each day. Eat your meals slowly in a place where you are relaxed. Avoid bending over or lying down until 2-3 hours after eating.  Limit high-fat foods such as fatty meats or fried foods.  Limit your intake of fatty foods, such as oils, butter, and shortening.  Avoid the following as told by your doctor:  Foods that cause symptoms. These may be different for different people. Keep a food diary to keep track of foods that cause symptoms.  Alcohol.  Drinking a lot of liquid with meals.  Eating meals during the 2-3 hours before bed.  Lifestyle  Stay at a healthy weight. Ask your doctor what weight is healthy for you. If you need to lose weight, work with your doctor to do so safely.  Exercise for at least 30 minutes on 5 or more days each week, or as told by your doctor.  Wear loose-fitting clothes.  Do not smoke or use any products that contain nicotine or tobacco. If you need help quitting, ask your doctor.  Sleep with the head  of your bed higher than your feet. Use a wedge under the mattress or blocks under the bed frame to raise the head of the bed.  Chew sugar-free gum after meals.  What foods should eat?  Eat a healthy, well-balanced diet of fruits, vegetables, whole grains, low-fat dairy products, lean meats, fish, and poultry. Each person is different.  Foods that may cause symptoms in one person may not cause any symptoms in another person. Work with your doctor to find foods that are safe for you.  The items listed above may not be a complete list of what you can eat and drink. Contact a food expert for more options.  What foods should I avoid?  Limiting some of these foods may help in managing the symptoms of GERD. Everyone is different. Talk with a food expert or your doctor to help you find the exact foods to avoid, if any.  Fruits  Any fruits prepared with added fat. Any fruits that cause symptoms. For some people, this may include citrus fruits, such as oranges, grapefruit, pineapple, and griselda.  Vegetables  Deep-fried vegetables. French fries. Any vegetables prepared with added fat. Any vegetables that cause symptoms. For some people, this may include tomatoes and tomato products, chili peppers, onions and garlic, and horseradish.  Grains  Pastries or quick breads with added fat.  Meats and other proteins  High-fat meats, such as fatty beef or pork, hot dogs, ribs, ham, sausage, salami, and jalloh. Fried meat or protein, including fried fish and fried chicken. Nuts and nut butters, in large amounts.  Dairy  Whole milk and chocolate milk. Sour cream. Cream. Ice cream. Cream cheese. Milkshakes.  Fats and oils  Butter. Margarine. Shortening. Ghee.  Beverages  Coffee and tea, with or without caffeine. Carbonated beverages. Sodas. Energy drinks. Fruit juice made with acidic fruits, such as orange or grapefruit. Tomato juice. Alcoholic drinks.  Sweets and desserts  Chocolate and cocoa. Donuts.  Seasonings and condiments  Pepper.  Peppermint and spearmint. Added salt. Any condiments, herbs, or seasonings that cause symptoms. For some people, this may include yu, hot sauce, or vinegar-based salad dressings.  The items listed above may not be a complete list of what you should not eat and drink. Contact a food expert for more options.  Questions to ask your doctor  Diet and lifestyle changes are often the first steps that are taken to manage symptoms of GERD. If diet and lifestyle changes do not help, talk with your doctor about taking medicines.  Where to find more information  International Foundation for Gastrointestinal Disorders: aboutgerd.org  Summary  When you have GERD, food and lifestyle choices are very important in easing your symptoms.  Eat small meals often instead of 3 large meals a day. Eat your meals slowly and in a place where you are relaxed.  Avoid bending over or lying down until 2-3 hours after eating.  Limit high-fat foods such as fatty meats or fried foods.  This information is not intended to replace advice given to you by your health care provider. Make sure you discuss any questions you have with your health care provider.  Document Revised: 06/28/2021 Document Reviewed: 06/28/2021  Elsevier Patient Education © 2022 Else[employer] Inc.

## 2023-07-24 DIAGNOSIS — K76.9 LIVER LESION: Primary | ICD-10-CM

## 2023-07-27 ENCOUNTER — TELEPHONE (OUTPATIENT)
Dept: GASTROENTEROLOGY | Facility: CLINIC | Age: 28
End: 2023-07-27
Payer: COMMERCIAL

## 2023-07-27 NOTE — TELEPHONE ENCOUNTER
----- Message from YULIANA Tafoya sent at 7/24/2023 10:01 AM EDT -----  Please let pt know that CT shows a small fat containing umbilical hernia.  Otherwise, no hernia evident.  There are 2 lesions in the liver that could be hemangiomas, but need MRI to confirm if these are concerning.  Please schedule.

## 2023-08-04 ENCOUNTER — TELEPHONE (OUTPATIENT)
Dept: GASTROENTEROLOGY | Facility: CLINIC | Age: 28
End: 2023-08-04
Payer: COMMERCIAL

## 2023-08-07 ENCOUNTER — TELEPHONE (OUTPATIENT)
Dept: GASTROENTEROLOGY | Facility: CLINIC | Age: 28
End: 2023-08-07
Payer: COMMERCIAL

## 2023-08-07 NOTE — TELEPHONE ENCOUNTER
Patient left a voice message on 8/4/23 wanting to know her arrival time of her procedure. I left her a voice message letting her know the arrival time is 9:30.

## 2023-08-07 NOTE — TELEPHONE ENCOUNTER
Caller: DURAN HATCH    Relationship to patient: SELF    Best call back number: [phone]     Patient is needing: MS. HATCH HAS A PROCEDURE ON 8/18/23 AND HAS SOME QUESTIONS IN REGARDS TO WHAT SHE DOES AND DOESN'T NEED TO DO PRIOR. PLEASE REVIEW AND ADVISE.    OK TO CALL ANYTIME. OK TO LEAVE VM

## 2023-08-09 NOTE — PRE-PROCEDURE INSTRUCTIONS
"Instructed on date and arrival time of 0930. Come to entrance \"C\".  Must have  over age 18 to drive home.  May have two visitors; however, children under 12 must stay in waiting room.  Discussed diet/NPO.  May take medications as usual except for blood thinners, diabetic medications, or weight loss medications.  Bring list of medications to hospital.  Verbalized understanding of instructions given.  Phone number given for questions or concerns.  "

## 2023-08-18 ENCOUNTER — ANESTHESIA EVENT (OUTPATIENT)
Dept: GASTROENTEROLOGY | Facility: HOSPITAL | Age: 28
End: 2023-08-18
Payer: COMMERCIAL

## 2023-08-18 ENCOUNTER — ANESTHESIA (OUTPATIENT)
Dept: GASTROENTEROLOGY | Facility: HOSPITAL | Age: 28
End: 2023-08-18
Payer: COMMERCIAL

## 2023-08-18 ENCOUNTER — HOSPITAL ENCOUNTER (OUTPATIENT)
Facility: HOSPITAL | Age: 28
Setting detail: HOSPITAL OUTPATIENT SURGERY
Discharge: HOME | End: 2023-08-18
Attending: INTERNAL MEDICINE | Admitting: INTERNAL MEDICINE
Payer: COMMERCIAL

## 2023-08-18 VITALS
HEIGHT: 64 IN | SYSTOLIC BLOOD PRESSURE: 115 MMHG | DIASTOLIC BLOOD PRESSURE: 69 MMHG | BODY MASS INDEX: 38.47 KG/M2 | RESPIRATION RATE: 17 BRPM | WEIGHT: 225.31 LBS | TEMPERATURE: 97.6 F | OXYGEN SATURATION: 97 % | HEART RATE: 64 BPM

## 2023-08-18 DIAGNOSIS — R13.19 ESOPHAGEAL DYSPHAGIA: ICD-10-CM

## 2023-08-18 DIAGNOSIS — R12 HEARTBURN: ICD-10-CM

## 2023-08-18 LAB — B-HCG UR QL: NEGATIVE

## 2023-08-18 PROCEDURE — 43249 ESOPH EGD DILATION <30 MM: CPT | Performed by: INTERNAL MEDICINE

## 2023-08-18 PROCEDURE — 81025 URINE PREGNANCY TEST: CPT | Performed by: INTERNAL MEDICINE

## 2023-08-18 PROCEDURE — 25010000002 PROPOFOL 10 MG/ML EMULSION: Performed by: NURSE ANESTHETIST, CERTIFIED REGISTERED

## 2023-08-18 PROCEDURE — 43239 EGD BIOPSY SINGLE/MULTIPLE: CPT | Performed by: INTERNAL MEDICINE

## 2023-08-18 PROCEDURE — 88305 TISSUE EXAM BY PATHOLOGIST: CPT | Performed by: INTERNAL MEDICINE

## 2023-08-18 PROCEDURE — C1726 CATH, BAL DIL, NON-VASCULAR: HCPCS | Performed by: INTERNAL MEDICINE

## 2023-08-18 RX ORDER — SODIUM CHLORIDE, SODIUM LACTATE, POTASSIUM CHLORIDE, CALCIUM CHLORIDE 600; 310; 30; 20 MG/100ML; MG/100ML; MG/100ML; MG/100ML
30 INJECTION, SOLUTION INTRAVENOUS CONTINUOUS
Status: DISCONTINUED | OUTPATIENT
Start: 2023-08-18 | End: 2023-08-18 | Stop reason: HOSPADM

## 2023-08-18 RX ORDER — PROPOFOL 10 MG/ML
VIAL (ML) INTRAVENOUS AS NEEDED
Status: DISCONTINUED | OUTPATIENT
Start: 2023-08-18 | End: 2023-08-18 | Stop reason: SURG

## 2023-08-18 RX ORDER — SODIUM CHLORIDE, SODIUM LACTATE, POTASSIUM CHLORIDE, CALCIUM CHLORIDE 600; 310; 30; 20 MG/100ML; MG/100ML; MG/100ML; MG/100ML
INJECTION, SOLUTION INTRAVENOUS CONTINUOUS PRN
Status: DISCONTINUED | OUTPATIENT
Start: 2023-08-18 | End: 2023-08-18 | Stop reason: SURG

## 2023-08-18 RX ORDER — LIDOCAINE HYDROCHLORIDE 20 MG/ML
INJECTION, SOLUTION EPIDURAL; INFILTRATION; INTRACAUDAL; PERINEURAL AS NEEDED
Status: DISCONTINUED | OUTPATIENT
Start: 2023-08-18 | End: 2023-08-18 | Stop reason: SURG

## 2023-08-18 RX ADMIN — SODIUM CHLORIDE, POTASSIUM CHLORIDE, SODIUM LACTATE AND CALCIUM CHLORIDE: 600; 310; 30; 20 INJECTION, SOLUTION INTRAVENOUS at 11:17

## 2023-08-18 RX ADMIN — LIDOCAINE HYDROCHLORIDE 100 MG: 20 INJECTION, SOLUTION EPIDURAL; INFILTRATION; INTRACAUDAL; PERINEURAL at 11:21

## 2023-08-18 RX ADMIN — PROPOFOL 250 MCG/KG/MIN: 10 INJECTION, EMULSION INTRAVENOUS at 11:23

## 2023-08-18 RX ADMIN — SODIUM CHLORIDE, POTASSIUM CHLORIDE, SODIUM LACTATE AND CALCIUM CHLORIDE 30 ML/HR: 600; 310; 30; 20 INJECTION, SOLUTION INTRAVENOUS at 10:07

## 2023-08-18 RX ADMIN — PROPOFOL 100 MG: 10 INJECTION, EMULSION INTRAVENOUS at 11:21

## 2023-08-18 NOTE — ANESTHESIA POSTPROCEDURE EVALUATION
Patient: Annette Stark    Procedure Summary       Date: 08/18/23 Room / Location: [facility] ENDOSCOPY 1 / [facility] ENDOSCOPY    Anesthesia Start: 1118 Anesthesia Stop: 1137    Procedure: ESOPHAGOGASTRODUODENOSCOPY with biopsies, dilation with 18-20 mm balloon Diagnosis:       Esophageal dysphagia      Heartburn      (Esophageal dysphagia [R13.19])      (Heartburn [R12])    Surgeons: Yasmeen Ruggiero MD Provider: Merrick Bishop MD    Anesthesia Type: general ASA Status: 3            Anesthesia Type: general    Vitals  Vitals Value Taken Time   /69 08/18/23 1200   Temp 36.4 øC (97.6 øF) 08/18/23 1200   Pulse 64 08/18/23 1200   Resp 17 08/18/23 1200   SpO2 97 % 08/18/23 1200           Post Anesthesia Care and Evaluation    Patient location during evaluation: bedside  Patient participation: complete - patient participated  Level of consciousness: awake  Pain management: adequate    Airway patency: patent  PONV Status: none  Cardiovascular status: acceptable  Respiratory status: acceptable  Hydration status: acceptable    Comments: An Anesthesiologist personally participated in the most demanding procedures (including induction and emergence if applicable) in the anesthesia plan, monitored the course of anesthesia administration at frequent intervals and remained physically present and available for immediate diagnosis and treatment of emergencies.

## 2023-08-18 NOTE — H&P
"Pre Procedure History & Physical    Chief Complaint:   Dysphagia, heartburn, generalized abd pain    Subjective     HPI:   26 yo F here for eval of dysphagia, heartburn, generalized abd pain.    Past Medical History:   Past Medical History:   Diagnosis Date    Anemia     Thyroid disorder        Past Surgical History:  Past Surgical History:   Procedure Laterality Date    CHOLECYSTECTOMY         Family History:  Family History   Problem Relation Age of Onset    Factor V Leiden deficiency Paternal Aunt     Factor V Leiden deficiency Paternal Grandmother     Breast cancer Paternal Grandmother     Colon cancer Paternal Grandfather        Social History:   reports that she has never smoked. She has never used smokeless tobacco. She reports that she does not currently use alcohol. She reports that she does not currently use drugs.    Medications:   Medications Prior to Admission   Medication Sig Dispense Refill Last Dose    busPIRone (BUSPAR) 15 MG tablet buspirone 15 mg oral tablet take 1 tablet by oral route 4 times a day   Active       levothyroxine (SYNTHROID, LEVOTHROID) 88 MCG tablet Take 1 tablet by mouth Daily.       pantoprazole (Protonix) 20 MG EC tablet Take 1 tablet by mouth Daily for 90 days. 90 tablet 1        Allergies:  Ciprofloxacin, Penicillins, and Sulfamethoxazole-trimethoprim    ROS:    Pertinent items are noted in HPI     Objective     Blood pressure 104/71, pulse 68, temperature 97.8 øF (36.6 øC), temperature source Temporal, resp. rate 20, height 162.6 cm (64\"), weight 102 kg (225 lb 5 oz), SpO2 99 %.    Physical Exam   Constitutional: Pt is oriented to person, place, and time and well-developed, well-nourished, and in no distress.   Mouth/Throat: Oropharynx is clear and moist.   Neck: Normal range of motion.   Cardiovascular: Normal rate, regular rhythm and normal heart sounds.    Pulmonary/Chest: Effort normal and breath sounds normal.   Abdominal: Soft. Nontender  Skin: Skin is warm and dry. "   Psychiatric: Mood, memory, affect and judgment normal.     Assessment & Plan     Diagnosis:  Dysphagia, heartburn, generalized abd pain    Anticipated Surgical Procedure:  EGD    The risks, benefits, and alternatives of this procedure have been discussed with the patient or the responsible party- the patient understands and agrees to proceed.

## 2023-08-18 NOTE — ANESTHESIA PREPROCEDURE EVALUATION
Anesthesia Evaluation     Patient summary reviewed and Nursing notes reviewed   no history of anesthetic complications:   NPO Solid Status: > 8 hours  NPO Liquid Status: > 2 hours           Airway   Mallampati: II  Dental      Pulmonary - negative pulmonary ROS   Cardiovascular - negative cardio ROS  Exercise tolerance: good (4-7 METS)        Neuro/Psych- negative ROS  GI/Hepatic/Renal/Endo    (+) thyroid problem     Musculoskeletal (-) negative ROS    Abdominal    Substance History - negative use     OB/GYN negative ob/gyn ROS         Other - negative ROS       ROS/Med Hx Other: PAT Nursing Notes unavailable.                 Anesthesia Plan    ASA 3     general       Anesthetic plan, risks, benefits, and alternatives have been provided, discussed and informed consent has been obtained with: patient and spouse/significant other.    CODE STATUS:

## 2023-08-21 LAB
CYTO UR: NORMAL
LAB AP CASE REPORT: NORMAL
LAB AP CLINICAL INFORMATION: NORMAL
PATH REPORT.FINAL DX SPEC: NORMAL
PATH REPORT.GROSS SPEC: NORMAL

## 2023-08-22 ENCOUNTER — TELEPHONE (OUTPATIENT)
Dept: GASTROENTEROLOGY | Facility: CLINIC | Age: 28
End: 2023-08-22
Payer: COMMERCIAL

## 2023-08-22 NOTE — TELEPHONE ENCOUNTER
----- Message from YULIANA Tafoya sent at 8/22/2023  1:39 PM EDT -----  Gastric biopsies normal.  Please schedule for f/u.

## 2023-08-29 ENCOUNTER — HOSPITAL ENCOUNTER (OUTPATIENT)
Dept: MRI IMAGING | Facility: HOSPITAL | Age: 28
Discharge: HOME | End: 2023-08-29
Admitting: NURSE PRACTITIONER
Payer: COMMERCIAL

## 2023-08-29 DIAGNOSIS — K76.9 LIVER LESION: ICD-10-CM

## 2023-08-29 PROCEDURE — A9577 INJ MULTIHANCE: HCPCS | Performed by: NURSE PRACTITIONER

## 2023-08-29 PROCEDURE — 74183 MRI ABD W/O CNTR FLWD CNTR: CPT

## 2023-08-29 PROCEDURE — 0 GADOBENATE DIMEGLUMINE 529 MG/ML SOLUTION: Performed by: NURSE PRACTITIONER

## 2023-08-29 RX ADMIN — GADOBENATE DIMEGLUMINE 20 ML: 529 INJECTION, SOLUTION INTRAVENOUS at 11:27

## 2023-08-30 DIAGNOSIS — K76.9 LIVER LESION: Primary | ICD-10-CM

## 2023-08-31 ENCOUNTER — TELEPHONE (OUTPATIENT)
Dept: GASTROENTEROLOGY | Facility: CLINIC | Age: 28
End: 2023-08-31
Payer: COMMERCIAL

## 2023-08-31 NOTE — TELEPHONE ENCOUNTER
----- Message from YULIANA Tafoya sent at 8/30/2023  3:48 PM EDT -----  Please let pt know that MRI shows 2 benign liver lesions.  Recommend repeat MRI with [facility] to help confirm.  Order placed for repeat in 6 months.

## 2023-12-04 ENCOUNTER — TELEPHONE (OUTPATIENT)
Dept: GASTROENTEROLOGY | Facility: CLINIC | Age: 28
End: 2023-12-04
Payer: COMMERCIAL

## 2023-12-04 ENCOUNTER — HOSPITAL ENCOUNTER (OUTPATIENT)
Dept: MRI IMAGING | Facility: HOSPITAL | Age: 28
Discharge: HOME | End: 2023-12-04
Admitting: NURSE PRACTITIONER
Payer: COMMERCIAL

## 2023-12-04 DIAGNOSIS — K76.9 LIVER LESION: ICD-10-CM

## 2023-12-04 PROCEDURE — 74183 MRI ABD W/O CNTR FLWD CNTR: CPT

## 2023-12-04 PROCEDURE — 25510000001 GADOXETATE 0.25 MOL/L SOLUTION: Performed by: NURSE PRACTITIONER

## 2023-12-04 PROCEDURE — A9581 GADOXETATE DISODIUM INJ: HCPCS | Performed by: NURSE PRACTITIONER

## 2023-12-04 RX ADMIN — GADOXETATE DISODIUM 10 ML: 181.43 INJECTION, SOLUTION INTRAVENOUS at 08:34

## 2023-12-04 NOTE — TELEPHONE ENCOUNTER
----- Message from YULIANA Tafoya sent at 12/4/2023 12:42 PM EST -----  MRI confirms that liver lesions are benign.  No further f/u needed.

## 2024-03-01 ENCOUNTER — TELEPHONE (OUTPATIENT)
Dept: GASTROENTEROLOGY | Facility: CLINIC | Age: 29
End: 2024-03-01
Payer: COMMERCIAL

## 2024-03-01 NOTE — TELEPHONE ENCOUNTER
Attempted to contact this patient went to voicemail. Left message that if patient would like to rescheduled to please give the office a call.

## 2024-03-01 NOTE — TELEPHONE ENCOUNTER
----- Message from Annette NDarian Mi sent at 2/29/2024 10:32 AM EST -----  Regarding: Appointment canceled  Contact: [phone]  Appointment canceled for Annette Stark ([id number])  Visit Type: FOLLOW UP  Date        Time      Length    Provider                  Department  3/7/2024     2:30 PM  15 mins.  Chela Perkins         [facility]    Reason for Cancellation: Other    Patient Comments: I have to take my sister to the oral surgeon that day and time. Forgot about it when I scheduled this appointment.

## 2024-06-05 ENCOUNTER — PATIENT ROUNDING (BHMG ONLY) (OUTPATIENT)
Dept: URGENT CARE | Facility: CLINIC | Age: 29
End: 2024-06-05
Payer: COMMERCIAL

## 2024-06-05 NOTE — ED NOTES
Thank you for letting us care for you in your recent visit to our urgent care center. We would love to hear about your experience with us. Was this the first time you have visited our location?    We’re always looking for ways to make our patients’ experiences even better. Do you have any recommendations on ways we may improve?     I appreciate you taking the time to respond. Please be on the lookout for a survey about your recent visit from [employer] via text or email. We would greatly appreciate if you could fill that out and turn it back in. We want your voice to be heard and we value your feedback.   Thank you for choosing [facility] for your healthcare needs.

## (undated) DEVICE — Device: Brand: DEFENDO AIR/WATER/SUCTION AND BIOPSY VALVE

## (undated) DEVICE — DEV INFL CRE STERIFLATE 60CC DISP

## (undated) DEVICE — SOLIDIFIER LIQLOC PLS 1500CC BT

## (undated) DEVICE — Device

## (undated) DEVICE — CONN JET HYDRA H20 AUXILIARY DISP

## (undated) DEVICE — SINGLE USE ELECTROSURGICAL HEMOSTATIC FORCEPS: Brand: SINGLE USE ELECTROSURGICAL HEMOSTATIC FORCEPS

## (undated) DEVICE — ESOPHAGEAL BALLOON DILATATION CATHETER: Brand: CRE FIXED WIRE

## (undated) DEVICE — BLCK/BITE BLOX WO/DENTL/RIM W/STRAP 54F

## (undated) DEVICE — LINER SURG CANSTR SXN S/RIGD 1500CC

## (undated) DEVICE — SOL IRRG H2O PL/BG 1000ML STRL